# Patient Record
Sex: FEMALE | Race: BLACK OR AFRICAN AMERICAN | NOT HISPANIC OR LATINO | URBAN - NONMETROPOLITAN AREA
[De-identification: names, ages, dates, MRNs, and addresses within clinical notes are randomized per-mention and may not be internally consistent; named-entity substitution may affect disease eponyms.]

---

## 2024-10-21 ENCOUNTER — OFFICE VISIT (OUTPATIENT)
Dept: URBAN - NONMETROPOLITAN AREA CLINIC 4 | Facility: CLINIC | Age: 39
End: 2024-10-21
Payer: MEDICARE

## 2024-10-21 ENCOUNTER — DASHBOARD ENCOUNTERS (OUTPATIENT)
Age: 39
End: 2024-10-21

## 2024-10-21 ENCOUNTER — TELEPHONE ENCOUNTER (OUTPATIENT)
Dept: URBAN - NONMETROPOLITAN AREA CLINIC 4 | Facility: CLINIC | Age: 39
End: 2024-10-21

## 2024-10-21 VITALS
DIASTOLIC BLOOD PRESSURE: 88 MMHG | SYSTOLIC BLOOD PRESSURE: 157 MMHG | BODY MASS INDEX: 35.82 KG/M2 | TEMPERATURE: 98 F | WEIGHT: 215 LBS | HEART RATE: 94 BPM | HEIGHT: 65 IN

## 2024-10-21 DIAGNOSIS — K62.5 RECTAL BLEED: ICD-10-CM

## 2024-10-21 DIAGNOSIS — Z12.11 SCREENING FOR COLON CANCER: ICD-10-CM

## 2024-10-21 PROCEDURE — 99214 OFFICE O/P EST MOD 30 MIN: CPT | Performed by: INTERNAL MEDICINE

## 2024-10-21 RX ORDER — POLYETHYLENE GLYCOL-3350 AND ELECTROLYTES WITH FLAVOR PACK 240; 5.84; 2.98; 6.72; 22.72 G/278.26G; G/278.26G; G/278.26G; G/278.26G; G/278.26G
AS DIRECTED POWDER, FOR SOLUTION ORAL
Qty: 1 | Refills: 0 | OUTPATIENT
Start: 2024-10-21 | End: 2024-10-22

## 2024-10-21 NOTE — HPI-TODAY'S VISIT:
10/21/2024 39 year old female  patient presents for rectal bleed. Patient has been having rectal bleeding in the comode and when she wipes. She says that she has to strain ocassionally. Patient denies colonoscopy in the past and says that she has family history of colon cancer. She says that she always feels fatigue.

## 2024-11-01 ENCOUNTER — CLAIMS CREATED FROM THE CLAIM WINDOW (OUTPATIENT)
Dept: URBAN - METROPOLITAN AREA SURGERY CENTER 13 | Facility: SURGERY CENTER | Age: 39
End: 2024-11-01
Payer: MEDICARE

## 2024-11-01 DIAGNOSIS — K62.5 RECTAL BLEED: ICD-10-CM

## 2024-11-01 DIAGNOSIS — Z12.11 COLON CANCER SCREENING: ICD-10-CM

## 2024-11-01 PROCEDURE — 45378 DIAGNOSTIC COLONOSCOPY: CPT | Performed by: INTERNAL MEDICINE

## 2024-11-01 PROCEDURE — 00812 ANES LWR INTST SCR COLSC: CPT | Performed by: ANESTHESIOLOGY

## 2024-11-01 PROCEDURE — 00812 ANES LWR INTST SCR COLSC: CPT | Performed by: ANESTHESIOLOGIST ASSISTANT

## 2024-11-01 PROCEDURE — 45378 DIAGNOSTIC COLONOSCOPY: CPT | Performed by: CLINIC/CENTER

## 2024-11-01 RX ORDER — HYDROCORTISONE ACETATE 25 MG/1
1 SUPPOSITORY SUPPOSITORY RECTAL ONCE A DAY
Qty: 14 | Refills: 3 | OUTPATIENT
Start: 2024-11-01 | End: 2024-12-26

## 2025-07-24 ENCOUNTER — OFFICE VISIT (OUTPATIENT)
Dept: URBAN - METROPOLITAN AREA CLINIC 115 | Facility: CLINIC | Age: 40
End: 2025-07-24
Payer: MEDICARE

## 2025-07-24 DIAGNOSIS — K62.5 RECTAL BLEED: ICD-10-CM

## 2025-07-24 DIAGNOSIS — R59.9 ENLARGED LYMPH NODE: ICD-10-CM

## 2025-07-24 DIAGNOSIS — R93.89 ABNORMAL CT SCAN: ICD-10-CM

## 2025-07-24 PROCEDURE — 99214 OFFICE O/P EST MOD 30 MIN: CPT | Performed by: INTERNAL MEDICINE

## 2025-07-24 RX ORDER — LURASIDONE HYDROCHLORIDE 80 MG/1
TAKE 1 ORAL TABLET ONCE A DAY ALONG WITH A 300 CALORIE MEAL TABLET ORAL
Qty: 30 EACH | Refills: 0 | Status: ACTIVE | COMMUNITY

## 2025-07-24 NOTE — HPI-TODAY'S VISIT:
10/21/2024 39 year old female  patient presents for rectal bleed. Patient has been having rectal bleeding in the comode and when she wipes. She says that she has to strain ocassionally. Patient denies colonoscopy in the past and says that she has family history of colon cancer. She says that she always feels fatigue.  7/24/2025 Patient presents for abnormal CT and retroperituneal lymp nodes. Patient had Colonoscopy on 11/1/2024 showed hemorrhoids. Patient went to hospital for back pains 2 weeks ago, she got a CT scan and showed lymp nodes. Patient mentions no stomach issues when she went to the hospital. Patient hasbipolar. Endorses blood in stool. Denies heartburn, acid refluc, dyspepsia. She deneis heart, lung, and kidney issues. She also denies have lympathic cancer.